# Patient Record
Sex: MALE | Race: WHITE | NOT HISPANIC OR LATINO | URBAN - METROPOLITAN AREA
[De-identification: names, ages, dates, MRNs, and addresses within clinical notes are randomized per-mention and may not be internally consistent; named-entity substitution may affect disease eponyms.]

---

## 2017-11-20 ENCOUNTER — OFFICE VISIT - HEALTHEAST (OUTPATIENT)
Dept: FAMILY MEDICINE | Facility: CLINIC | Age: 37
End: 2017-11-20

## 2017-11-20 DIAGNOSIS — E78.5 HYPERLIPIDEMIA: ICD-10-CM

## 2017-11-20 DIAGNOSIS — Z71.84 COUNSELING ABOUT TRAVEL: ICD-10-CM

## 2017-11-20 ASSESSMENT — MIFFLIN-ST. JEOR: SCORE: 2029.68

## 2021-05-31 VITALS — HEIGHT: 76 IN | BODY MASS INDEX: 27.19 KG/M2 | WEIGHT: 223.31 LBS

## 2021-06-14 NOTE — PROGRESS NOTES
"Assessment / Impression     1. Counseling about travel     2. Hyperlipidemia           Plan:     He appears to be in good health.  I encouraged him to work on eating a healthy diet and getting regular exercise since his cholesterol is mildly elevated in the past.  I filled out the medical certificate for foreign workers that he needs prior to starting his job in [employer].    Subjective:      HPI: Earnest Rondon is a 37 y.o. male who presents to the clinic for travel consultation.  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  He and his wife will be moving there together.  They do not have children.  He reports that he feels healthy.  He remembers being vaccinated for hepatitis A and B prior to a trip to [place] several years ago.  In 2015 his cholesterol was mildly elevated.  His fasting glucose, urinalysis and liver enzymes were normal at that time.        Review of Systems  All other systems reviewed and are negative.     History   Smoking Status     Never Smoker   Smokeless Tobacco     Never Used       No family history on file.    Objective:     /66 (Patient Site: Left Arm, Patient Position: Sitting, Cuff Size: Adult Large)  Pulse 72  Temp 98.3  F (36.8  C) (Oral)   Resp 16  Ht 6' 3.7\" (1.923 m)  Wt (!) 223 lb 5 oz (101.3 kg)  BMI 27.4 kg/m2  Physical Examination: General appearance - alert, well appearing, and in no distress  Eyes: pupils equal and reactive, extraocular eye movements intact  Mouth: mucous membranes moist, pharynx normal without lesions  Neck: supple, no significant adenopathy  Lungs: clear to auscultation, no wheezes, rales or rhonchi, symmetric air entry  Heart: normal rate, regular rhythm, normal S1, S2, no murmurs, rubs, clicks or gallops  Abdomen: soft, nontender, nondistended, no masses or organomegaly  Neurological: alert, oriented, normal speech, no focal findings or movement disorder noted.    Extremities: No edema, no clubbing or " cyanosis  Psychiatric: Normal affect. Does not appear anxious or depressed.    No results found for this or any previous visit (from the past 168 hour(s)).    No current outpatient prescriptions on file.